# Patient Record
Sex: FEMALE | Race: WHITE | NOT HISPANIC OR LATINO | URBAN - METROPOLITAN AREA
[De-identification: names, ages, dates, MRNs, and addresses within clinical notes are randomized per-mention and may not be internally consistent; named-entity substitution may affect disease eponyms.]

---

## 2024-03-28 ENCOUNTER — EMERGENCY (EMERGENCY)
Facility: HOSPITAL | Age: 79
LOS: 1 days | Discharge: HOME | End: 2024-03-28
Attending: EMERGENCY MEDICINE | Admitting: EMERGENCY MEDICINE
Payer: MEDICARE

## 2024-03-28 VITALS
HEART RATE: 83 BPM | RESPIRATION RATE: 18 BRPM | DIASTOLIC BLOOD PRESSURE: 89 MMHG | OXYGEN SATURATION: 95 % | TEMPERATURE: 98 F | SYSTOLIC BLOOD PRESSURE: 146 MMHG

## 2024-03-28 VITALS
WEIGHT: 169.98 LBS | HEART RATE: 92 BPM | TEMPERATURE: 98 F | DIASTOLIC BLOOD PRESSURE: 81 MMHG | OXYGEN SATURATION: 98 % | RESPIRATION RATE: 18 BRPM | SYSTOLIC BLOOD PRESSURE: 132 MMHG

## 2024-03-28 DIAGNOSIS — Z96.643 PRESENCE OF ARTIFICIAL HIP JOINT, BILATERAL: ICD-10-CM

## 2024-03-28 DIAGNOSIS — S82.254A NONDISPLACED COMMINUTED FRACTURE OF SHAFT OF RIGHT TIBIA, INITIAL ENCOUNTER FOR CLOSED FRACTURE: ICD-10-CM

## 2024-03-28 DIAGNOSIS — M25.561 PAIN IN RIGHT KNEE: ICD-10-CM
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
## 2024-03-28 DIAGNOSIS — Z95.0 PRESENCE OF CARDIAC PACEMAKER: ICD-10-CM

## 2024-03-28 DIAGNOSIS — E78.5 HYPERLIPIDEMIA, UNSPECIFIED: ICD-10-CM

## 2024-03-28 DIAGNOSIS — M17.11 UNILATERAL PRIMARY OSTEOARTHRITIS, RIGHT KNEE: ICD-10-CM
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
## 2024-03-28 DIAGNOSIS — I10 ESSENTIAL (PRIMARY) HYPERTENSION: ICD-10-CM
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
## 2024-03-28 PROCEDURE — 96374 THER/PROPH/DIAG INJ IV PUSH: CPT

## 2024-03-28 PROCEDURE — 96375 TX/PRO/DX INJ NEW DRUG ADDON: CPT

## 2024-03-28 PROCEDURE — 73502 X-RAY EXAM HIP UNI 2-3 VIEWS: CPT | Mod: 26,RT

## 2024-03-28 PROCEDURE — 73502 X-RAY EXAM HIP UNI 2-3 VIEWS: CPT

## 2024-03-28 PROCEDURE — 73700 CT LOWER EXTREMITY W/O DYE: CPT | Mod: 26,RT,MC

## 2024-03-28 PROCEDURE — 99285 EMERGENCY DEPT VISIT HI MDM: CPT

## 2024-03-28 PROCEDURE — 73610 X-RAY EXAM OF ANKLE: CPT | Mod: 26,RT

## 2024-03-28 PROCEDURE — 73610 X-RAY EXAM OF ANKLE: CPT

## 2024-03-28 PROCEDURE — 73562 X-RAY EXAM OF KNEE 3: CPT | Mod: 26,RT

## 2024-03-28 PROCEDURE — 73590 X-RAY EXAM OF LOWER LEG: CPT | Mod: 26,RT

## 2024-03-28 PROCEDURE — 73700 CT LOWER EXTREMITY W/O DYE: CPT | Mod: MC

## 2024-03-28 PROCEDURE — 99284 EMERGENCY DEPT VISIT MOD MDM: CPT

## 2024-03-28 PROCEDURE — 99284 EMERGENCY DEPT VISIT MOD MDM: CPT | Mod: 25

## 2024-03-28 PROCEDURE — 73590 X-RAY EXAM OF LOWER LEG: CPT

## 2024-03-28 PROCEDURE — 73562 X-RAY EXAM OF KNEE 3: CPT

## 2024-03-28 RX ORDER — KETOROLAC TROMETHAMINE 30 MG/ML
15 SYRINGE (ML) INJECTION ONCE
Refills: 0 | Status: DISCONTINUED | OUTPATIENT
Start: 2024-03-28 | End: 2024-03-28

## 2024-03-28 RX ORDER — OXYCODONE AND ACETAMINOPHEN 5; 325 MG/1; MG/1
1 TABLET ORAL ONCE
Refills: 0 | Status: DISCONTINUED | OUTPATIENT
Start: 2024-03-28 | End: 2024-03-28

## 2024-03-28 RX ORDER — ACETAMINOPHEN 500 MG
1000 TABLET ORAL ONCE
Refills: 0 | Status: COMPLETED | OUTPATIENT
Start: 2024-03-28 | End: 2024-03-28

## 2024-03-28 RX ADMIN — Medication 400 MILLIGRAM(S): at 14:02

## 2024-03-28 RX ADMIN — OXYCODONE AND ACETAMINOPHEN 1 TABLET(S): 5; 325 TABLET ORAL at 19:49

## 2024-03-28 RX ADMIN — Medication 15 MILLIGRAM(S): at 14:02

## 2024-03-28 NOTE — ED PROVIDER NOTE - PATIENT PORTAL LINK FT
You can access the FollowMyHealth Patient Portal offered by [facility] by registering at the following website: [email or web address]. By joining [employer]’s FollowMyHealth portal, you will also be able to view your health information using other applications (apps) compatible with our system.

## 2024-03-28 NOTE — ED ADULT NURSE NOTE - WITNESSED BY
Please schedule an appointment with Dr. Barnard Please bring your Insurance card, Photo ID, Covid-19 vaccination card (if applicable) and Discharge paperwork to the following appointment:    (1) Please follow up with your Infectious Disease Provider, Dr. Joyce Barnard at [address] [place] [address], [address] [place] [address] on 11/18/2021 at 10:20am.    Appointment was scheduled by Ms. ARNAUD Acosta, Referral Coordinator.   daughter

## 2024-03-28 NOTE — ED PROVIDER NOTE - OBJECTIVE STATEMENT
79 y/o f with PMH of htn and hld presents to ED with mechanical fall night prior with severe right knee pain.  Pt unable to ambulate since fall.  Hx of b/l hip replacements.  No head trauma, neck pain or other injury.  Pt is not on AC.  Took ibuprofen night prior.  Pt from [place] and is expected to return tomorrow on plane.  Pt with hx of b/l hip replacements.

## 2024-03-28 NOTE — ED PROVIDER NOTE - NSFOLLOWUPINSTRUCTIONS_ED_ALL_ED_FT
1. You were seen for proximal tibia fracture. Please wear knee immobilizer and do not bear weight on right leg at all. Use crutches for ambulation. Follow up with an orthopedic doctor asap in [place]. A copy of any of your resulted labs, imaging, and findings have been provided to you. Make sure to view any test results that may not have yet resulted at the time of your discharge by creating a FollowMyHealth account at: [email or web address] to sign up for FollowMyHealth.   2. Continue to take your home medications as prescribed.   3. Please follow up with your primary care physician. You may call our referrals coordinator at [phone] Monday to Friday 11am-7pm for assistance with making an appointment. Or you can call [phone] to make an appointment.  4. Return to the emergency department for new, persistent, or worsening symptoms or signs, or for any concerning symptoms.   5. For your for health, you should make healthy food choices and be physically active. Also, you should not smoke or use drugs, and you should not drink alcohol in excess. Please visit [email or web address] for resources and more information.    Tibial Fracture, Adult    A tibial bone break (fracture) is an injury to the larger bone in the lower leg (tibia). This bone is also called the shin bone.    What are the causes?  This condition is caused by:  An injury to the leg. This may happen because of:  A fall.  Playing sports.  A car accident.  What increases the risk?  Playing sports that have a lot of running and jumping.  Doing activities or sports that involve doing the same movements over and over, such as long-distance running.  Having weak bones (osteoporosis).  Being an older adult. There is more risk as you get older.  What are the signs or symptoms?  Pain.  Swelling.  Bruising.  Not being able to walk or put weight on the leg.  A change to the shape of your leg. It may not look normal.  Losing feeling (having numbness) or feeling prickling and tingling in your feet.  How is this treated?  Wearing a cast, splint, or brace until your bone has healed.  Using crutches as told by your doctor. This helps you to not put weight on your leg.  Doing exercises (physical therapy). If your doctor tells you to exercise, you will start after your cast, splint, or brace is removed.  If the injury causes part of the bone to move out of place, your doctor may:  Need to put the bone back in place before putting on a cast, splint, or brace.  Do surgery to put metal rods, plates, or screws into the bone to hold it in place.  Follow these instructions at home:  If you have a splint or brace:    Wear the splint or brace as told by your doctor. Take it off only as told by your doctor.  Loosen the splint or brace if your toes:  Tingle.  Become numb.  Turn cold and blue.  Keep the splint or brace clean and dry.  Check the skin around the splint or brace every day. Tell your doctor if you see any problems.  If you have a cast:    Do not put pressure on any part of the cast until it is fully hardened.  Do not stick anything inside the cast to scratch your skin.  Check the skin around the cast every day. Tell your doctor if you see problems.  You may put lotion on dry skin around the cast. Do not put lotion on the skin under the cast.  Keep the cast clean and dry.  Bathing    Do not take baths, swim, or use a hot tub. Ask your doctor about taking showers or sponge baths.  If the cast, splint, or brace is not waterproof:  Do not let it get wet.  Cover it with a watertight covering when you take a bath or shower.  Managing pain, stiffness, and swelling      If told, put ice on the injured area. To do this:  If you have a removable splint or brace, take it off as told by your doctor.  Put ice in a plastic bag.  Place a towel between your skin and the bag or between your cast and the bag.  Leave the ice on for 20 minutes, 2–3 times a day.  Take off the ice if your skin turns bright red. This is very important. If you cannot feel pain, heat, or cold, you have a greater risk of damage to the area.  Move your toes often.  Raise the injured area above the level of your heart while you are sitting or lying down.  Activity    Use crutches to support your body weight. Do not use your injured leg to support your body weight until your doctor says that you can.  Return to your normal activities when your doctor says that it is safe.  Avoid activities as told by your doctor.  Do exercises as told by your doctor.  Medicines    Ask your doctor if you should take supplements to help your bones heal.  Take over-the-counter and prescription medicines only as told by your doctor.  Ask your doctor if you should avoid driving or using machines while you are taking your medicine.  If told, take steps to prevent problems with pooping (constipation). You may need to:  Drink enough fluid to keep your pee (urine) pale yellow.  Take medicines. You will be told what medicines to take.  Eat foods that are high in fiber. These include beans, whole grains, and fresh fruits and vegetables.  Limit foods that are high in fat and sugars. These include fried or sweet foods.  General instructions    Do not use any products that contain nicotine or tobacco, such as cigarettes, e-cigarettes, and chewing tobacco. These can delay bone healing. If you need help quitting, ask your doctor.  Ask your doctor when it is safe to drive if you have a cast, splint, or brace on your leg.  Keep all follow-up visits.  Contact a doctor if you have:  Pain that gets worse or does not get better with medicine.  Redness or swelling that gets worse.  Numbness or tingling in your toes or foot.  Get help right away if:  Your foot or toes get cold or turn blue, even after you loosen your splint.  You have very bad pain.  Summary  A tibial fracture is a break in the larger bone in your lower leg.  You will need to wear a cast, splint, or brace until the bone is healed.  Use crutches as told by your doctor.  Ask your doctor what activities are safe for you.  This information is not intended to replace advice given to you by your health care provider. Make sure you discuss any questions you have with your health care provider.

## 2024-03-28 NOTE — ED PROVIDER NOTE - PROGRESS NOTE DETAILS
D/w ortho resident who reviewed images with their attending and state pt can be DC'd in knee immobilizer with crutches, non weight bearing RLE. F/u with ortho as outpt (pt wants to f/u in [place]). per ortho ok for patient to fly.

## 2024-03-28 NOTE — ED ADULT NURSE NOTE - OBJECTIVE STATEMENT
78yF pmhx HTN presents to the ER complaining of a mechanical trip and fall yesterday evening, (-)LOC, (-) AC use, (+) headstrike. PT states that she tripped over a raised surface landing on her right knee, complaining of swelling, inability to move knee since. PT states after she fell she "bumped her head" denies any headache, changes in vision. Denies F/C, CP/SOB, changes in sensation.

## 2024-03-28 NOTE — ED PROVIDER NOTE - CLINICAL SUMMARY MEDICAL DECISION MAKING FREE TEXT BOX
79 y/o f with PMH of htn and hld presents to ED with mechanical fall night prior with severe right knee pain.  Pt unable to ambulate since fall.  Hx of b/l hip replacements.  No head trauma, neck pain or other injury.  Pt is not on AC.  Took ibuprofen night prior.  Pt from [place] and is expected to return tomorrow on plane.  Pt with hx of b/l hip replacements.    VSS  PE = Prox tibia tenderness, limited range of motion at knee, no distal nv deficit,   CT with prox tibia fx   Ortho called for treatment plan  pain meds in ED - toradol and IV tylenol

## 2024-03-28 NOTE — CONSULT NOTE ADULT - SUBJECTIVE AND OBJECTIVE BOX
Orthopaedic Surgery Consult Note    CC: Patient is a 78y old  Female who presents with a chief complaint of right leg pain    HPI:  79yo Female with PMHx **** presenting with right lower extremity pain s/p mechanical fall last night. **    ROS: Positive for right leg pain  Denies fevers, chills, headache, dizziness, chest pain, shortness of breath, nausea, vomiting.   All other systems negative, except for above.     Allergies  No Known Allergies      PAST MEDICAL & SURGICAL HISTORY:    Social History:    FAMILY HISTORY:    Medications:     Vital Signs Last 24 Hrs  T(C): 36.8 (28 Mar 2024 13:01), Max: 36.8 (28 Mar 2024 13:01)  T(F): 98.3 (28 Mar 2024 13:01), Max: 98.3 (28 Mar 2024 13:01)  HR: 92 (28 Mar 2024 13:01) (92 - 92)  BP: 132/81 (28 Mar 2024 13:01) (132/81 - 132/81)  BP(mean): --  RR: 18 (28 Mar 2024 13:01) (18 - 18)  SpO2: 98% (28 Mar 2024 13:01) (98% - 98%)    Parameters below as of 28 Mar 2024 13:01  Patient On (Oxygen Delivery Method): room air        PE:  General: Comfortable, NAD  Neuro: Alert, oriented x 3  Psych: Normal mood & affect   Skin: Warm, dry, extremities well perfused, no obvious rash  MSK:    -Inspection/palpation:    -Sensation:    -Motor:     -ROM:    -Pulses:           Imaging:   Right Knee X-ray:   IMPRESSION:  Comminuted nondisplaced fracture of the proximal tibial metadiaphysis.    Right Knee CT:   IMPRESSION:  1. Acute nondisplaced oblique longitudinal right proximal tibial fracture.  2. Severe right knee arthrosis with lipohemarthrosis.  3. Moderate volume right calf acute intermuscular hematoma.    Pelvis X-ray:   IMPRESSION:  No acute fracture.            A/P: 78yFemale with Right Proximal Tibia Fracture  - Imaging reviewed and case discussed with Dr. Perez   ******** PLAN PENDING **********           Orthopaedic Surgery Consult Note    CC: Patient is a 78y old  Female who presents with a chief complaint of right leg pain    HPI:  77yo Female with PMHx **** presenting with right lower extremity pain s/p mechanical fall last night. **    ROS: Positive for right leg pain  Denies fevers, chills, headache, dizziness, chest pain, shortness of breath, nausea, vomiting.   All other systems negative, except for above.     Allergies  No Known Allergies      PAST MEDICAL & SURGICAL HISTORY:    Social History:    FAMILY HISTORY:    Medications:     Vital Signs Last 24 Hrs  T(C): 36.8 (28 Mar 2024 13:01), Max: 36.8 (28 Mar 2024 13:01)  T(F): 98.3 (28 Mar 2024 13:01), Max: 98.3 (28 Mar 2024 13:01)  HR: 92 (28 Mar 2024 13:01) (92 - 92)  BP: 132/81 (28 Mar 2024 13:01) (132/81 - 132/81)  BP(mean): --  RR: 18 (28 Mar 2024 13:01) (18 - 18)  SpO2: 98% (28 Mar 2024 13:01) (98% - 98%)    Parameters below as of 28 Mar 2024 13:01  Patient On (Oxygen Delivery Method): room air        PE:  General: Comfortable, NAD  Neuro: Alert, oriented x 3  Psych: Normal mood & affect   Skin: Warm, dry, extremities well perfused, no obvious rash  MSK:    -Inspection/palpation:    -Sensation:    -Motor:     -ROM:    -Pulses:           Imaging:   Right Knee X-ray:   IMPRESSION:  Comminuted nondisplaced fracture of the proximal tibial metadiaphysis.    Right Knee CT:   IMPRESSION:  1. Acute nondisplaced oblique longitudinal right proximal tibial fracture.  2. Severe right knee arthrosis with lipohemarthrosis.  3. Moderate volume right calf acute intermuscular hematoma.    Pelvis X-ray:   IMPRESSION:  No acute fracture.            A/P: 78yFemale with Right Proximal Tibia Fracture  - Imaging reviewed and case discussed with Dr. Perez   - No acute surgical intervention indicated at this time   - Recommend dedicated tibia/fibula xray films to further evaluated fracture  - Pending xray films, will likely place in [place]   - Pain control per primary team  - WBS: NWB to RLE  - Patient should follow up with orthopedic surgeon in [place] within 1 week            Orthopaedic Surgery Consult Note    CC: Patient is a 78y old  Female who presents with a chief complaint of right leg pain    HPI:  77yo Female with PMHx HTN, HLD, pacemaker, chronic peripheral neuropathy, s/p bilateral THR presenting with right lower extremity pain s/p mechanical fall last night. Patient was walking when her foot got caught on a bump and she fell forward onto her right knee. No headstrike or LOC. Patient was unable to bear weight immediately following the incident. She notes anterior proximal shin pain. Patient has been traveling from [place] and plans to return tomorrow. Ambulates without assistance at baseline.     ROS: Positive for right leg pain  Denies fevers, chills, headache, dizziness, chest pain, shortness of breath, nausea, vomiting.   All other systems negative, except for above.     Allergies  No Known Allergies      PAST MEDICAL & SURGICAL HISTORY:    Social History:    FAMILY HISTORY:    Medications:     Vital Signs Last 24 Hrs  T(C): 36.8 (28 Mar 2024 13:01), Max: 36.8 (28 Mar 2024 13:01)  T(F): 98.3 (28 Mar 2024 13:01), Max: 98.3 (28 Mar 2024 13:01)  HR: 92 (28 Mar 2024 13:01) (92 - 92)  BP: 132/81 (28 Mar 2024 13:01) (132/81 - 132/81)  BP(mean): --  RR: 18 (28 Mar 2024 13:01) (18 - 18)  SpO2: 98% (28 Mar 2024 13:01) (98% - 98%)    Parameters below as of 28 Mar 2024 13:01  Patient On (Oxygen Delivery Method): room air        PE:  General: Comfortable, NAD  Neuro: Alert, oriented x 3  Psych: Normal mood & affect   Skin: Warm, dry, extremities well perfused, no obvious rash  MSK:    -Inspection/palpation: Dry flaky skin to bilateral lower extremities, superficial abrasions to right shin, ttp along right proximal anterior shin. Mild swelling to right knee. No ecchymosis, erythema, or warmth.     -Sensation: Chronic bilateral peripheral neuropathy to distal lower extremities, otherwise SILT bilaterally    -Motor: EHL/FHL/TA/GS 5/5 bilaterally    -ROM: Knee ROM exam deferred due to fracture. No pain with passive dorsiflexion of bilateral ankles     -Pulses: 2+ DP pulses palpable bilaterally, skin wwp, cap refill brisk   Secondary survey revealed no ttp or further injuries to bilateral upper and lower extremities.           Imaging:   Right Knee X-ray:   IMPRESSION:  Comminuted nondisplaced fracture of the proximal tibial metadiaphysis.    Right Knee CT:   IMPRESSION:  1. Acute nondisplaced oblique longitudinal right proximal tibial fracture.  2. Severe right knee arthrosis with lipohemarthrosis.  3. Moderate volume right calf acute intermuscular hematoma.    Pelvis X-ray:   IMPRESSION:  No acute fracture.            A/P: 78yFemale with Right Proximal Tibia Fracture  - Imaging reviewed and case discussed with Dr. Perez   - No acute surgical intervention indicated at this time   - Recommend dedicated tibia/fibula xray films to further evaluate fracture  - Pending xray films, will likely place in [place]   - Pain control per primary team  - WBS: NWB to RLE  - Patient should follow up with orthopedic surgeon in [place] within 1 week

## 2024-03-28 NOTE — ED ADULT NURSE NOTE - CHIEF COMPLAINT QUOTE
Patient to the ED c/o mechanical trip and fall last night, landing on right knee. C/O right knee pain, states she is unable to bear weight due to pain. +headstrike, -LOC, -AC. No other obvious signs of injury, AAOX4, NAD.

## 2024-03-28 NOTE — ED ADULT NURSE NOTE - NSFALLRISKINTERV_ED_ALL_ED

## 2024-03-28 NOTE — ED PROVIDER NOTE - PHYSICAL EXAMINATION
VITAL SIGNS: I have reviewed nursing notes and confirm.  CONSTITUTIONAL: Well-developed; well-nourished; in no acute distress.  SKIN: Agree with RN documentation regarding decubitus evaluation. Remainder of skin exam is warm and dry, no acute rash.  HEAD: Normocephalic; atraumatic.  EYES: PERRL, EOM intact; conjunctiva and sclera clear.  ENT: No nasal discharge; airway clear.  NECK: Supple; non tender.  CARD: S1, S2 normal; no murmurs, gallops, or rubs. Regular rate and rhythm.  RESP: No wheezes, rales or rhonchi.  ABD: Normal bowel sounds; soft; non-distended; non-tender; no hepatosplenomegaly.  EXT: right lower ext - + prox tibia tenderness and swelling, + knee effusion, severely decreased range of motion at knee, no distal nv deficit, FROM at hip, no ankle deformity  LYMPH: No acute cervical adenopathy.  NEURO: Alert, oriented. Grossly unremarkable.  PSYCH: Cooperative, appropriate.

## 2024-03-28 NOTE — ED ADULT TRIAGE NOTE - CHIEF COMPLAINT QUOTE
----- Message from Gonsalo Mckee MD sent at 1/8/2020 10:43 AM CST -----  Call patient, diabetes test negative.  Hepatitis C test negative.  CBC did not show anemia.  Cholesterol is good.  Thyroid is normal.  Liver and kidney test is normal.  Uric acid is borderline high, reduce seafood intake and alcohol intake and try to drink more water  Vitamin-D level is low, take over-the-counter vitamin-D 2000 units daily.   Patient to the ED c/o mechanical trip and fall last night, landing on right knee. C/O right knee pain, states she is unable to bear weight due to pain. +headstrike, -LOC, -AC. No other obvious signs of injury, AAOX4, NAD.